# Patient Record
Sex: FEMALE
[De-identification: names, ages, dates, MRNs, and addresses within clinical notes are randomized per-mention and may not be internally consistent; named-entity substitution may affect disease eponyms.]

---

## 2018-06-13 ENCOUNTER — HOSPITAL ENCOUNTER (OUTPATIENT)
Dept: HOSPITAL 92 - BICMAMMO | Age: 83
Discharge: HOME | End: 2018-06-13
Attending: INTERNAL MEDICINE
Payer: MEDICARE

## 2018-06-13 DIAGNOSIS — C50.212: ICD-10-CM

## 2018-06-13 DIAGNOSIS — Z78.0: Primary | ICD-10-CM

## 2018-06-13 PROCEDURE — 77080 DXA BONE DENSITY AXIAL: CPT

## 2018-06-15 ENCOUNTER — HOSPITAL ENCOUNTER (EMERGENCY)
Dept: HOSPITAL 92 - ERS | Age: 83
Discharge: HOME | End: 2018-06-15
Payer: MEDICARE

## 2018-06-15 DIAGNOSIS — E03.9: ICD-10-CM

## 2018-06-15 DIAGNOSIS — L03.114: ICD-10-CM

## 2018-06-15 DIAGNOSIS — Z79.899: ICD-10-CM
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
## 2018-06-15 DIAGNOSIS — S61.032A: Primary | ICD-10-CM

## 2018-06-15 DIAGNOSIS — I10: ICD-10-CM

## 2018-06-15 PROCEDURE — 96372 THER/PROPH/DIAG INJ SC/IM: CPT

## 2018-06-15 PROCEDURE — 90715 TDAP VACCINE 7 YRS/> IM: CPT

## 2018-06-15 PROCEDURE — 90471 IMMUNIZATION ADMIN: CPT

## 2019-02-14 ENCOUNTER — HOSPITAL ENCOUNTER (EMERGENCY)
Dept: HOSPITAL 92 - ERS | Age: 84
End: 2019-02-14
Payer: MEDICARE

## 2019-02-14 DIAGNOSIS — H11.429: ICD-10-CM

## 2019-02-14 DIAGNOSIS — F32.9: ICD-10-CM

## 2019-02-14 DIAGNOSIS — E78.5: ICD-10-CM

## 2019-02-14 DIAGNOSIS — K13.0: Primary | ICD-10-CM

## 2019-02-14 DIAGNOSIS — I10: ICD-10-CM

## 2019-02-14 DIAGNOSIS — B35.1: ICD-10-CM

## 2019-02-14 DIAGNOSIS — M25.511: ICD-10-CM

## 2019-02-14 DIAGNOSIS — N39.0: ICD-10-CM

## 2019-02-14 DIAGNOSIS — E03.9: ICD-10-CM

## 2019-02-14 DIAGNOSIS — F41.9: ICD-10-CM

## 2019-02-14 LAB
ALBUMIN SERPL BCG-MCNC: 4.1 G/DL (ref 3.4–4.8)
ALP SERPL-CCNC: 74 U/L (ref 40–150)
ALT SERPL W P-5'-P-CCNC: 7 U/L (ref 8–55)
ANION GAP SERPL CALC-SCNC: 12 MMOL/L (ref 10–20)
AST SERPL-CCNC: 18 U/L (ref 5–34)
BASOPHILS # BLD AUTO: 0 THOU/UL (ref 0–0.2)
BASOPHILS NFR BLD AUTO: 1 % (ref 0–1)
BILIRUB SERPL-MCNC: 0.3 MG/DL (ref 0.2–1.2)
BUN SERPL-MCNC: 16 MG/DL (ref 9.8–20.1)
CALCIUM SERPL-MCNC: 10.4 MG/DL (ref 7.8–10.44)
CHLORIDE SERPL-SCNC: 103 MMOL/L (ref 98–107)
CO2 SERPL-SCNC: 28 MMOL/L (ref 23–31)
CREAT CL PREDICTED SERPL C-G-VRATE: 0 ML/MIN (ref 70–130)
CRYSTAL-AUWI FLAG: 0 (ref 0–15)
EOSINOPHIL # BLD AUTO: 0 THOU/UL (ref 0–0.7)
EOSINOPHIL NFR BLD AUTO: 0.3 % (ref 0–10)
GLOBULIN SER CALC-MCNC: 2.9 G/DL (ref 2.4–3.5)
GLUCOSE SERPL-MCNC: 113 MG/DL (ref 83–110)
HEV IGM SER QL: 0.1 (ref 0–7.99)
HGB BLD-MCNC: 12.5 G/DL (ref 12–16)
HYALINE CASTS #/AREA URNS LPF: (no result) LPF
LYMPHOCYTES # BLD: 1.3 THOU/UL (ref 1.2–3.4)
LYMPHOCYTES NFR BLD AUTO: 39.5 % (ref 21–51)
MCH RBC QN AUTO: 31.1 PG (ref 27–31)
MCV RBC AUTO: 94.9 FL (ref 78–98)
MONOCYTES # BLD AUTO: 0.5 THOU/UL (ref 0.11–0.59)
MONOCYTES NFR BLD AUTO: 14.5 % (ref 0–10)
NEUTROPHILS # BLD AUTO: 1.5 THOU/UL (ref 1.4–6.5)
NEUTROPHILS NFR BLD AUTO: 44.7 % (ref 42–75)
PATHC CAST-AUWI FLAG: 2.32 (ref 0–2.49)
PLATELET # BLD AUTO: 145 THOU/UL (ref 130–400)
POTASSIUM SERPL-SCNC: 4.1 MMOL/L (ref 3.5–5.1)
RBC # BLD AUTO: 4.02 MILL/UL (ref 4.2–5.4)
RBC UR QL AUTO: (no result) HPF (ref 0–3)
SODIUM SERPL-SCNC: 139 MMOL/L (ref 136–145)
SP GR UR STRIP: 1.02 (ref 1–1.04)
SPERM-AUWI FLAG: 0 (ref 0–9.9)
WBC # BLD AUTO: 3.3 THOU/UL (ref 4.8–10.8)
YEAST-AUWI FLAG: 0 (ref 0–25)

## 2019-02-14 PROCEDURE — 72170 X-RAY EXAM OF PELVIS: CPT

## 2019-02-14 PROCEDURE — 36415 COLL VENOUS BLD VENIPUNCTURE: CPT

## 2019-02-14 PROCEDURE — 87077 CULTURE AEROBIC IDENTIFY: CPT

## 2019-02-14 PROCEDURE — 51701 INSERT BLADDER CATHETER: CPT

## 2019-02-14 PROCEDURE — 81003 URINALYSIS AUTO W/O SCOPE: CPT

## 2019-02-14 PROCEDURE — 85025 COMPLETE CBC W/AUTO DIFF WBC: CPT

## 2019-02-14 PROCEDURE — 96372 THER/PROPH/DIAG INJ SC/IM: CPT

## 2019-02-14 PROCEDURE — A4353 INTERMITTENT URINARY CATH: HCPCS

## 2019-02-14 PROCEDURE — 81015 MICROSCOPIC EXAM OF URINE: CPT

## 2019-02-14 PROCEDURE — 87086 URINE CULTURE/COLONY COUNT: CPT

## 2019-02-14 PROCEDURE — 70450 CT HEAD/BRAIN W/O DYE: CPT

## 2019-02-14 PROCEDURE — 87186 SC STD MICRODIL/AGAR DIL: CPT

## 2019-02-14 PROCEDURE — 80053 COMPREHEN METABOLIC PANEL: CPT

## 2019-02-14 NOTE — RAD
LEFT HIP 2 VIEWS:

 

Date:  02/14/19 

 

HISTORY:  

Fall. 

 

COMPARISON:  

Pelvic radiograph same date. 

 

FINDINGS:

No acute fracture or malalignment. Soft tissues unremarkable. 

 

IMPRESSION: 

No acute fracture or malalignment. 

 

 

POS: [facility]

## 2019-02-14 NOTE — CT
CT BRAIN WITHOUT CONTRAST:

 

HISTORY: 

Altered mental status.  Emergency exam.

 

COMPARISON: 

CT brain 12/11/2014.

 

FINDINGS: 

There is moderate atrophy.  Moderate microvascular ischemic changes.  Encephalomalacia right temporop
arietal lobe.

 

No acute hemorrhage or infarct.  No midline shift or mass effect.

 

Single prominent vessel within the right middle frontal sulcus.

 

IMPRESSION: 

No acute intracranial abnormality.

 

POS: [facility]

## 2019-02-14 NOTE — RAD
PELVIS AP STANDARD:

 

Date:  02/14/19 

 

HISTORY:  

Fall. 

 

COMPARISON:  

None. 

 

FINDINGS:

Posterior spinal fusion hardware L4-S1. There is either a vascular aneurysm to the distal aorta at L3
 level versus a large exophytic bridging osteophyte. 

 

The obturator rings are intact. Mild narrowing of both hip joints. Acetabular osteophyte formation is
 present. 

 

Enthesopathic changes both hamstring tendons, as well as iliopsoas tendons. Moderate stool burden. 

 

IMPRESSION: 

1.  No acute fracture of the pelvis. 

2.  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
te L3-4. Nonemergent ultrasound abdomen may be beneficial in this patient. 

 

 

POS: [facility]